# Patient Record
Sex: FEMALE | Race: WHITE | NOT HISPANIC OR LATINO | Employment: FULL TIME | URBAN - METROPOLITAN AREA
[De-identification: names, ages, dates, MRNs, and addresses within clinical notes are randomized per-mention and may not be internally consistent; named-entity substitution may affect disease eponyms.]

---

## 2020-01-06 ENCOUNTER — HOSPITAL ENCOUNTER (OUTPATIENT)
Dept: LAB | Facility: MEDICAL CENTER | Age: 30
End: 2020-01-06
Attending: INTERNAL MEDICINE
Payer: COMMERCIAL

## 2020-01-06 ENCOUNTER — OFFICE VISIT (OUTPATIENT)
Dept: CARDIOLOGY | Facility: MEDICAL CENTER | Age: 30
End: 2020-01-06
Payer: COMMERCIAL

## 2020-01-06 VITALS
WEIGHT: 180 LBS | HEART RATE: 100 BPM | HEIGHT: 64 IN | BODY MASS INDEX: 30.73 KG/M2 | DIASTOLIC BLOOD PRESSURE: 80 MMHG | OXYGEN SATURATION: 98 % | SYSTOLIC BLOOD PRESSURE: 140 MMHG

## 2020-01-06 DIAGNOSIS — E78.5 HYPERLIPIDEMIA, UNSPECIFIED HYPERLIPIDEMIA TYPE: ICD-10-CM

## 2020-01-06 DIAGNOSIS — I34.1 MITRAL VALVE PROLAPSE: ICD-10-CM

## 2020-01-06 DIAGNOSIS — Z82.49 FAMILY HISTORY OF CORONARY ARTERY DISEASE: ICD-10-CM

## 2020-01-06 LAB
CHOLEST SERPL-MCNC: 206 MG/DL (ref 100–199)
EKG IMPRESSION: NORMAL
FASTING STATUS PATIENT QL REPORTED: NORMAL
HDLC SERPL-MCNC: 35 MG/DL
LDLC SERPL CALC-MCNC: 136 MG/DL
TRIGL SERPL-MCNC: 176 MG/DL (ref 0–149)

## 2020-01-06 PROCEDURE — 36415 COLL VENOUS BLD VENIPUNCTURE: CPT

## 2020-01-06 PROCEDURE — 99244 OFF/OP CNSLTJ NEW/EST MOD 40: CPT | Performed by: INTERNAL MEDICINE

## 2020-01-06 PROCEDURE — 80061 LIPID PANEL: CPT

## 2020-01-06 PROCEDURE — 93000 ELECTROCARDIOGRAM COMPLETE: CPT | Performed by: INTERNAL MEDICINE

## 2020-01-06 RX ORDER — FEXOFENADINE HCL 60 MG/1
60 TABLET, FILM COATED ORAL DAILY
COMMUNITY

## 2020-01-06 RX ORDER — DIAZEPAM 2 MG/1
2 TABLET ORAL
COMMUNITY
Start: 2015-09-23 | End: 2020-01-06

## 2020-01-06 RX ORDER — CETIRIZINE HYDROCHLORIDE 10 MG/1
TABLET ORAL
COMMUNITY
End: 2020-01-06

## 2020-01-06 RX ORDER — FLUTICASONE PROPIONATE 50 MCG
2 SPRAY, SUSPENSION (ML) NASAL
COMMUNITY
Start: 2015-02-11 | End: 2020-01-06

## 2020-01-06 ASSESSMENT — ENCOUNTER SYMPTOMS
ORTHOPNEA: 0
DEPRESSION: 0
PALPITATIONS: 0
FALLS: 0
LOSS OF CONSCIOUSNESS: 0
SHORTNESS OF BREATH: 0
PND: 0
DIZZINESS: 0
ABDOMINAL PAIN: 0

## 2020-01-06 NOTE — LETTER
[facility]   [address]   [address]  [place] [address]  Phone: [phone]  Fax: [phone]              Cecilia Agarwal  1990    Encounter Date: 1/6/2020    Amy José M.D.          PROGRESS NOTE:  Chief Complaint   Patient presents with   • Hyperlipidemia       Subjective:   Cecilia Agarwal is a 29 y.o. female referred to cardiology clinic for management of hyperlipidemia.    Patient is a  of [employer] and reports that she had been eating pizza very regularly till October when she had her lipid panel checked.  Since then she has become a vegan for the most part and does eat fish as well.  She has not had any pizza in the past 3 months.  She has lost 20 pounds with her dietary modification.    She exercises 3 times a week on the elliptical covering 3.5 miles in 30 minutes without any anginal symptoms.    She worries about her hyperlipidemia as her father had an MI at the age of 50.  Since then he has had a CABG and has multiple stents.  None of her father's siblings, parents or her siblings have heart disease.    Patient reports that about 5 years ago she was seen by a cardiologist in [place] and was told that she may have mitral valve prolapse.    Referring physician: DILLON Pereira    History reviewed. No pertinent past medical history.  History reviewed. No pertinent surgical history.  Family History   Problem Relation Age of Onset   • Heart Attack Father 50     Social History     Socioeconomic History   • Marital status:      Spouse name: Not on file   • Number of children: Not on file   • Years of education: Not on file   • Highest education level: Not on file   Occupational History   • Not on file   Social Needs   • Financial resource strain: Not on file   • Food insecurity:     Worry: Not on file     Inability: Not on file   • Transportation needs:     Medical: Not on file     Non-medical: Not  on file   Tobacco Use   • Smoking status: Never Smoker   • Smokeless tobacco: Never Used   Substance and Sexual Activity   • Alcohol use: Yes     Comment: 1 glass of wine in a week   • Drug use: Never   • Sexual activity: Not on file     Comment:    Lifestyle   • Physical activity:     Days per week: Not on file     Minutes per session: Not on file   • Stress: Not on file   Relationships   • Social connections:     Talks on phone: Not on file     Gets together: Not on file     Attends Hindu service: Not on file     Active member of club or organization: Not on file     Attends meetings of clubs or organizations: Not on file     Relationship status: Not on file   • Intimate partner violence:     Fear of current or ex partner: Not on file     Emotionally abused: Not on file     Physically abused: Not on file     Forced sexual activity: Not on file   Other Topics Concern   • Not on file   Social History Narrative   • Not on file     Allergies   Allergen Reactions   • Flagyl [Kdc:Metronidazole+Tartrazine] Palpitations     Tingling in arms,burning chest,racing heart     Outpatient Encounter Medications as of 1/6/2020   Medication Sig Dispense Refill   • fexofenadine (ALLEGRA) 60 MG Tab Take 60 mg by mouth every day.     • [DISCONTINUED] fluticasone (FLONASE) 50 MCG/ACT nasal spray Spray 2 Sprays in nose.     • [DISCONTINUED] diazePAM (VALIUM) 2 MG Tab Take 2 mg by mouth.     • [DISCONTINUED] Albuterol Sulfate 108 (90 Base) MCG/ACT AEROSOL POWDER, BREATH ACTIVATED Take 1-4 puff every 4-6 hours as needed     • [DISCONTINUED] cetirizine (ZYRTEC) 10 MG Tab Take  by mouth.     • [DISCONTINUED] promethazine-codeine (PHENERGAN-CODEINE) 6.25-10 MG/5ML SYRP Take 5 mL by mouth 4 times a day as needed for Cough. 120 mL 0     No facility-administered encounter medications on file as of 1/6/2020.      Review of Systems   Constitutional: Negative for malaise/fatigue.   Respiratory: Negative for shortness of breath.     "  Cardiovascular: Negative for chest pain, palpitations, orthopnea, leg swelling and PND.   Gastrointestinal: Negative for abdominal pain.   Musculoskeletal: Negative for falls.   Neurological: Negative for dizziness and loss of consciousness.   Psychiatric/Behavioral: Negative for depression.   All other systems reviewed and are negative.       Objective:   /80 (BP Location: Left arm, Patient Position: Sitting)   Pulse 100   Ht 1.626 m (5' 4\")   Wt 81.6 kg (180 lb)   SpO2 98%   BMI 30.90 kg/m²      Physical Exam   Constitutional: She is oriented to person, place, and time. She appears well-developed and well-nourished. No distress.   HENT:   Head: Normocephalic and atraumatic.   Eyes: Conjunctivae are normal. No scleral icterus.   Neck: Normal range of motion. Neck supple.   Cardiovascular: Normal rate, regular rhythm and normal heart sounds. Exam reveals no gallop and no friction rub.   No murmur heard.  Pulmonary/Chest: Effort normal and breath sounds normal. No respiratory distress. She has no wheezes. She has no rales.   Abdominal: Soft. She exhibits no distension. There is no tenderness.   Musculoskeletal:         General: No edema.   Neurological: She is alert and oriented to person, place, and time.   Skin: Skin is warm and dry. She is not diaphoretic.   Psychiatric: She has a normal mood and affect. Her behavior is normal.   Nursing note and vitals reviewed.    EKG performed today was personally reviewed and per my interpretation shows sinus rhythm.    Labs performed in October 2019 were reviewed and showed , HDL 35, triglycerides 309.  Normal creatinine.  Normal hemoglobin.      Assessment:     1. Hyperlipidemia, unspecified hyperlipidemia type  EKG    Lipid Profile   2. Family history of coronary artery disease     3. Mitral valve prolapse         Medical Decision Making:  Today's Assessment / Status / Plan:     Hyperlipidemia: Lipid panel results were discussed in detail with the " patient.  Her abnormal numbers are likely secondary to her unhealthy diet prior to her lab results.  I have advised her to have a healthy diet, everything in moderation.  Repeat labs ordered today.  No indication for statin therapy at this time.    Family history of coronary artery disease: Patient's father had an MI at the age of 50.  No other family members with coronary artery disease.  For now would not recommend any new medications or work-up.  Patient should continue exercising as she has been.  If she has any anginal symptoms, she should let us know.    Concern for mitral valve prolapse: No murmur appreciated exam today.  Patient is asymptomatic.  Continue to monitor.    Elevated blood pressure: Her blood pressure is always normal at home but elevated in the medical office.  Likely whitecoat hypertension.  No further work-up for now.    Return to clinic in 1 year or earlier if needed.    Thank you for allowing me to participate in the care of this patient. Please do not hesitate to contact me with any questions.    Amy José MD, [facility]  Cardiologist  [facility]    PLEASE NOTE: This dictation was created using voice recognition software.         Violetta Arceo A.P.R.NUsman  [address] [address]  VIA Facsimile: [phone]

## 2020-01-06 NOTE — PROGRESS NOTES
Chief Complaint   Patient presents with   • Hyperlipidemia       Subjective:   Cecilia Agarwal is a 29 y.o. female referred to cardiology clinic for management of hyperlipidemia.    Patient is a  of a [employer] and reports that she had been eating pizza very regularly till October when she had her lipid panel checked.  Since then she has become a vegan for the most part and does eat fish as well.  She has not had any pizza in the past 3 months.  She has lost 20 pounds with her dietary modification.    She exercises 3 times a week on the elliptical covering 3.5 miles in 30 minutes without any anginal symptoms.    She worries about her hyperlipidemia as her father had an MI at the age of 50.  Since then he has had a CABG and has multiple stents.  None of her father's siblings, parents or her siblings have heart disease.    Patient reports that about 5 years ago she was seen by a cardiologist in [place] and was told that she may have mitral valve prolapse.    Referring physician: DILLON Pereira    History reviewed. No pertinent past medical history.  History reviewed. No pertinent surgical history.  Family History   Problem Relation Age of Onset   • Heart Attack Father 50     Social History     Socioeconomic History   • Marital status:      Spouse name: Not on file   • Number of children: Not on file   • Years of education: Not on file   • Highest education level: Not on file   Occupational History   • Not on file   Social Needs   • Financial resource strain: Not on file   • Food insecurity:     Worry: Not on file     Inability: Not on file   • Transportation needs:     Medical: Not on file     Non-medical: Not on file   Tobacco Use   • Smoking status: Never Smoker   • Smokeless tobacco: Never Used   Substance and Sexual Activity   • Alcohol use: Yes     Comment: 1 glass of wine in a week   • Drug use: Never   • Sexual activity: Not on file     Comment:    Lifestyle   • Physical  activity:     Days per week: Not on file     Minutes per session: Not on file   • Stress: Not on file   Relationships   • Social connections:     Talks on phone: Not on file     Gets together: Not on file     Attends Sabianism service: Not on file     Active member of club or organization: Not on file     Attends meetings of clubs or organizations: Not on file     Relationship status: Not on file   • Intimate partner violence:     Fear of current or ex partner: Not on file     Emotionally abused: Not on file     Physically abused: Not on file     Forced sexual activity: Not on file   Other Topics Concern   • Not on file   Social History Narrative   • Not on file     Allergies   Allergen Reactions   • Flagyl [Kdc:Metronidazole+Tartrazine] Palpitations     Tingling in arms,burning chest,racing heart     Outpatient Encounter Medications as of 1/6/2020   Medication Sig Dispense Refill   • fexofenadine (ALLEGRA) 60 MG Tab Take 60 mg by mouth every day.     • [DISCONTINUED] fluticasone (FLONASE) 50 MCG/ACT nasal spray Spray 2 Sprays in nose.     • [DISCONTINUED] diazePAM (VALIUM) 2 MG Tab Take 2 mg by mouth.     • [DISCONTINUED] Albuterol Sulfate 108 (90 Base) MCG/ACT AEROSOL POWDER, BREATH ACTIVATED Take 1-4 puff every 4-6 hours as needed     • [DISCONTINUED] cetirizine (ZYRTEC) 10 MG Tab Take  by mouth.     • [DISCONTINUED] promethazine-codeine (PHENERGAN-CODEINE) 6.25-10 MG/5ML SYRP Take 5 mL by mouth 4 times a day as needed for Cough. 120 mL 0     No facility-administered encounter medications on file as of 1/6/2020.      Review of Systems   Constitutional: Negative for malaise/fatigue.   Respiratory: Negative for shortness of breath.    Cardiovascular: Negative for chest pain, palpitations, orthopnea, leg swelling and PND.   Gastrointestinal: Negative for abdominal pain.   Musculoskeletal: Negative for falls.   Neurological: Negative for dizziness and loss of consciousness.   Psychiatric/Behavioral: Negative for  "depression.   All other systems reviewed and are negative.       Objective:   /80 (BP Location: Left arm, Patient Position: Sitting)   Pulse 100   Ht 1.626 m (5' 4\")   Wt 81.6 kg (180 lb)   SpO2 98%   BMI 30.90 kg/m²     Physical Exam   Constitutional: She is oriented to person, place, and time. She appears well-developed and well-nourished. No distress.   HENT:   Head: Normocephalic and atraumatic.   Eyes: Conjunctivae are normal. No scleral icterus.   Neck: Normal range of motion. Neck supple.   Cardiovascular: Normal rate, regular rhythm and normal heart sounds. Exam reveals no gallop and no friction rub.   No murmur heard.  Pulmonary/Chest: Effort normal and breath sounds normal. No respiratory distress. She has no wheezes. She has no rales.   Abdominal: Soft. She exhibits no distension. There is no tenderness.   Musculoskeletal:         General: No edema.   Neurological: She is alert and oriented to person, place, and time.   Skin: Skin is warm and dry. She is not diaphoretic.   Psychiatric: She has a normal mood and affect. Her behavior is normal.   Nursing note and vitals reviewed.    EKG performed today was personally reviewed and per my interpretation shows sinus rhythm.    Labs performed in October 2019 were reviewed and showed , HDL 35, triglycerides 309.  Normal creatinine.  Normal hemoglobin.      Assessment:     1. Hyperlipidemia, unspecified hyperlipidemia type  EKG    Lipid Profile   2. Family history of coronary artery disease     3. Mitral valve prolapse         Medical Decision Making:  Today's Assessment / Status / Plan:     Hyperlipidemia: Lipid panel results were discussed in detail with the patient.  Her abnormal numbers are likely secondary to her unhealthy diet prior to her lab results.  I have advised her to have a healthy diet, everything in moderation.  Repeat labs ordered today.  No indication for statin therapy at this time.    Family history of coronary artery " disease: Patient's father had an MI at the age of 50.  No other family members with coronary artery disease.  For now would not recommend any new medications or work-up.  Patient should continue exercising as she has been.  If she has any anginal symptoms, she should let us know.    Concern for mitral valve prolapse: No murmur appreciated exam today.  Patient is asymptomatic.  Continue to monitor.    Elevated blood pressure: Her blood pressure is always normal at home but elevated in the medical office.  Likely whitecoat hypertension.  No further work-up for now.    Return to clinic in 1 year or earlier if needed.    Thank you for allowing me to participate in the care of this patient. Please do not hesitate to contact me with any questions.    Amy José MD, [facility]  Cardiologist  [facility]    PLEASE NOTE: This dictation was created using voice recognition software.

## 2020-01-29 ENCOUNTER — TELEPHONE (OUTPATIENT)
Dept: CARDIOLOGY | Facility: MEDICAL CENTER | Age: 30
End: 2020-01-29

## 2020-08-25 ENCOUNTER — PATIENT MESSAGE (OUTPATIENT)
Dept: CARDIOLOGY | Facility: MEDICAL CENTER | Age: 30
End: 2020-08-25

## 2020-08-25 ENCOUNTER — HOSPITAL ENCOUNTER (EMERGENCY)
Facility: MEDICAL CENTER | Age: 30
End: 2020-08-26
Attending: EMERGENCY MEDICINE
Payer: COMMERCIAL

## 2020-08-25 VITALS
SYSTOLIC BLOOD PRESSURE: 145 MMHG | HEART RATE: 92 BPM | RESPIRATION RATE: 16 BRPM | OXYGEN SATURATION: 97 % | BODY MASS INDEX: 30.37 KG/M2 | DIASTOLIC BLOOD PRESSURE: 101 MMHG | TEMPERATURE: 98.2 F | WEIGHT: 177.91 LBS | HEIGHT: 64 IN

## 2020-08-25 DIAGNOSIS — Z82.49 FAMILY HISTORY OF CORONARY ARTERY DISEASE: ICD-10-CM

## 2020-08-25 DIAGNOSIS — E78.5 HYPERLIPIDEMIA, UNSPECIFIED HYPERLIPIDEMIA TYPE: ICD-10-CM

## 2020-08-25 DIAGNOSIS — R10.13 EPIGASTRIC PAIN: ICD-10-CM

## 2020-08-25 DIAGNOSIS — R07.9 CHEST PAIN, UNSPECIFIED TYPE: ICD-10-CM

## 2020-08-25 PROCEDURE — 99285 EMERGENCY DEPT VISIT HI MDM: CPT

## 2020-08-25 PROCEDURE — 93005 ELECTROCARDIOGRAM TRACING: CPT | Performed by: EMERGENCY MEDICINE

## 2020-08-25 PROCEDURE — 93005 ELECTROCARDIOGRAM TRACING: CPT

## 2020-08-25 PROCEDURE — 96375 TX/PRO/DX INJ NEW DRUG ADDON: CPT

## 2020-08-25 PROCEDURE — 96374 THER/PROPH/DIAG INJ IV PUSH: CPT

## 2020-08-25 SDOH — HEALTH STABILITY: MENTAL HEALTH: HOW OFTEN DO YOU HAVE 6 OR MORE DRINKS ON ONE OCCASION?: NEVER

## 2020-08-25 SDOH — HEALTH STABILITY: MENTAL HEALTH: HOW MANY STANDARD DRINKS CONTAINING ALCOHOL DO YOU HAVE ON A TYPICAL DAY?: 1 OR 2

## 2020-08-25 SDOH — HEALTH STABILITY: MENTAL HEALTH: HOW OFTEN DO YOU HAVE A DRINK CONTAINING ALCOHOL?: 2-4 TIMES A MONTH

## 2020-08-26 ENCOUNTER — APPOINTMENT (OUTPATIENT)
Dept: RADIOLOGY | Facility: MEDICAL CENTER | Age: 30
End: 2020-08-26
Attending: EMERGENCY MEDICINE
Payer: COMMERCIAL

## 2020-08-26 LAB
ALBUMIN SERPL BCP-MCNC: 4.8 G/DL (ref 3.2–4.9)
ALBUMIN/GLOB SERPL: 1.9 G/DL
ALP SERPL-CCNC: 44 U/L (ref 30–99)
ALT SERPL-CCNC: 24 U/L (ref 2–50)
ANION GAP SERPL CALC-SCNC: 15 MMOL/L (ref 7–16)
AST SERPL-CCNC: 16 U/L (ref 12–45)
BASOPHILS # BLD AUTO: 0.8 % (ref 0–1.8)
BASOPHILS # BLD: 0.05 K/UL (ref 0–0.12)
BILIRUB SERPL-MCNC: 0.5 MG/DL (ref 0.1–1.5)
BUN SERPL-MCNC: 9 MG/DL (ref 8–22)
CALCIUM SERPL-MCNC: 9.5 MG/DL (ref 8.5–10.5)
CHLORIDE SERPL-SCNC: 98 MMOL/L (ref 96–112)
CO2 SERPL-SCNC: 22 MMOL/L (ref 20–33)
CREAT SERPL-MCNC: 0.75 MG/DL (ref 0.5–1.4)
EKG IMPRESSION: NORMAL
EOSINOPHIL # BLD AUTO: 0.1 K/UL (ref 0–0.51)
EOSINOPHIL NFR BLD: 1.7 % (ref 0–6.9)
ERYTHROCYTE [DISTWIDTH] IN BLOOD BY AUTOMATED COUNT: 38 FL (ref 35.9–50)
GLOBULIN SER CALC-MCNC: 2.5 G/DL (ref 1.9–3.5)
GLUCOSE SERPL-MCNC: 102 MG/DL (ref 65–99)
HCG SERPL QL: NEGATIVE
HCT VFR BLD AUTO: 42.2 % (ref 37–47)
HGB BLD-MCNC: 14.1 G/DL (ref 12–16)
IMM GRANULOCYTES # BLD AUTO: 0.01 K/UL (ref 0–0.11)
IMM GRANULOCYTES NFR BLD AUTO: 0.2 % (ref 0–0.9)
LIPASE SERPL-CCNC: 24 U/L (ref 11–82)
LYMPHOCYTES # BLD AUTO: 2.47 K/UL (ref 1–4.8)
LYMPHOCYTES NFR BLD: 41.9 % (ref 22–41)
MCH RBC QN AUTO: 29.6 PG (ref 27–33)
MCHC RBC AUTO-ENTMCNC: 33.4 G/DL (ref 33.6–35)
MCV RBC AUTO: 88.5 FL (ref 81.4–97.8)
MONOCYTES # BLD AUTO: 0.33 K/UL (ref 0–0.85)
MONOCYTES NFR BLD AUTO: 5.6 % (ref 0–13.4)
NEUTROPHILS # BLD AUTO: 2.94 K/UL (ref 2–7.15)
NEUTROPHILS NFR BLD: 49.8 % (ref 44–72)
NRBC # BLD AUTO: 0 K/UL
NRBC BLD-RTO: 0 /100 WBC
PLATELET # BLD AUTO: 242 K/UL (ref 164–446)
PMV BLD AUTO: 9.4 FL (ref 9–12.9)
POTASSIUM SERPL-SCNC: 3.5 MMOL/L (ref 3.6–5.5)
PROT SERPL-MCNC: 7.3 G/DL (ref 6–8.2)
RBC # BLD AUTO: 4.77 M/UL (ref 4.2–5.4)
SODIUM SERPL-SCNC: 135 MMOL/L (ref 135–145)
TROPONIN T SERPL-MCNC: <6 NG/L (ref 6–19)
WBC # BLD AUTO: 5.9 K/UL (ref 4.8–10.8)

## 2020-08-26 PROCEDURE — A9270 NON-COVERED ITEM OR SERVICE: HCPCS | Performed by: EMERGENCY MEDICINE

## 2020-08-26 PROCEDURE — 85025 COMPLETE CBC W/AUTO DIFF WBC: CPT

## 2020-08-26 PROCEDURE — 700111 HCHG RX REV CODE 636 W/ 250 OVERRIDE (IP): Performed by: EMERGENCY MEDICINE

## 2020-08-26 PROCEDURE — 80053 COMPREHEN METABOLIC PANEL: CPT

## 2020-08-26 PROCEDURE — 83690 ASSAY OF LIPASE: CPT

## 2020-08-26 PROCEDURE — 71045 X-RAY EXAM CHEST 1 VIEW: CPT

## 2020-08-26 PROCEDURE — 84703 CHORIONIC GONADOTROPIN ASSAY: CPT

## 2020-08-26 PROCEDURE — 700102 HCHG RX REV CODE 250 W/ 637 OVERRIDE(OP): Performed by: EMERGENCY MEDICINE

## 2020-08-26 PROCEDURE — 84484 ASSAY OF TROPONIN QUANT: CPT

## 2020-08-26 PROCEDURE — 700105 HCHG RX REV CODE 258: Performed by: EMERGENCY MEDICINE

## 2020-08-26 RX ORDER — OMEPRAZOLE 20 MG/1
20 CAPSULE, DELAYED RELEASE ORAL DAILY
Qty: 30 CAP | Refills: 0 | Status: SHIPPED | OUTPATIENT
Start: 2020-08-26 | End: 2020-09-03 | Stop reason: SDUPTHER

## 2020-08-26 RX ORDER — SODIUM CHLORIDE 9 MG/ML
1000 INJECTION, SOLUTION INTRAVENOUS ONCE
Status: COMPLETED | OUTPATIENT
Start: 2020-08-26 | End: 2020-08-26

## 2020-08-26 RX ORDER — ONDANSETRON 2 MG/ML
4 INJECTION INTRAMUSCULAR; INTRAVENOUS ONCE
Status: COMPLETED | OUTPATIENT
Start: 2020-08-26 | End: 2020-08-26

## 2020-08-26 RX ADMIN — SODIUM CHLORIDE 1000 ML: 9 INJECTION, SOLUTION INTRAVENOUS at 00:11

## 2020-08-26 RX ADMIN — FAMOTIDINE 20 MG: 10 INJECTION INTRAVENOUS at 00:11

## 2020-08-26 RX ADMIN — LIDOCAINE HYDROCHLORIDE 30 ML: 20 SOLUTION OROPHARYNGEAL at 00:11

## 2020-08-26 RX ADMIN — ONDANSETRON 4 MG: 2 INJECTION INTRAMUSCULAR; INTRAVENOUS at 00:11

## 2020-08-26 NOTE — ED TRIAGE NOTES
"Cecilia Agarwal   30 y.o. female     Chief Complaint   Patient presents with   • Chest Pain     \"steady pain in my chest starts in my diaphragm and works it way up\" \"it feels like heart burn that never goes away\"    • Abdominal Pain     \"its a sharp nodded feeling\"       Pt amb to triage with steady gait for above complaint.     Chest Pain - Started on Sunday. Starts in the center of the chest and radiated to the right. Describes as \"achy burning.\"     ABD - Sharp, nodded pain that happened on Wednesday. Felt better after BM     Pt is alert and oriented, speaking in full sentences, follows commands and responds appropriately to questions. NAD. Resp are even and unlabored.     Pt placed in lobby. Pt educated on triage process. Pt encouraged to alert staff for any changes.    /104   Pulse 92   Temp 36.8 °C (98.2 °F) (Temporal)   Resp 16   Ht 1.626 m (5' 4\")   Wt 80.7 kg (177 lb 14.6 oz)   LMP 08/11/2020   SpO2 97%   BMI 30.54 kg/m²       "

## 2020-08-26 NOTE — DISCHARGE INSTRUCTIONS
You were seen in the Emergency Department for abdominal pain and chest pain.    EKG, labs, chest xray were completed without significant acute abnormalities.    Please use 1,000mg of tylenol  every 6 hours as needed for pain. Take acid blocking medication as directed.  Avoid spicy foods, alcohol, NSAIDs such as ibuprofen.    Please follow up with your primary care physician.    Return to the Emergency Department with worsening symptoms.

## 2020-08-26 NOTE — ED PROVIDER NOTES
"ED Provider Note    Scribed for Rebeca Alvarez M.D. by Rosalba Garcia. 8/25/2020  11:54 PM    Means of arrival: walk in  History obtained from: patient  History limited by: none    CHIEF COMPLAINT  Chief Complaint   Patient presents with   • Chest Pain     \"steady pain in my chest starts in my diaphragm and works it way up\" \"it feels like heart burn that never goes away\"    • Abdominal Pain     \"its a sharp nodded feeling\"       HPI  Cecilia Agarwal is a 30 y.o. female who presents to the Emergency Department for moderate chest pain and abdominal pain. Per the patient, her abdominal pain is constant and began 1 week ago; she describes this pain as a \"knotting\" pain. She states her chest pain is a constant aching pain that radiates from the middle of her chest up to her neck; this symptom began two days ago. She reports additional symptoms of nausea, cough, and dyspnea, and denies fever or lower extremity edema. She states that she is currently trying to get pregnant. The patient also denies abdominal surgery. No alleviating or exacerbating factors were noted.     REVIEW OF SYSTEMS  Pertinent positive include chest pain, abdominal pain, nausea, cough, and dyspnea. Pertinent negative include fever or lower extremity edema. All other systems reviewed and are negative.    PAST MEDICAL HISTORY   None pertinent    SOCIAL HISTORY  Social History     Tobacco Use   • Smoking status: Never Smoker   • Smokeless tobacco: Never Used   Substance and Sexual Activity   • Alcohol use: Yes     Frequency: 2-4 times a month     Drinks per session: 1 or 2     Binge frequency: Never     Comment: 1 glass of wine in a week   • Drug use: Never   • Sexual activity: Not noted     Comment:        SURGICAL HISTORY  patient denies any surgical history    CURRENT MEDICATIONS  Home Medications     Reviewed by Bao Chatman R.N. (Registered Nurse) on 08/25/20 at 2310  Med List Status: <None>   Medication Last Dose " "Status   fexofenadine (ALLEGRA) 60 MG Tab  Active                ALLERGIES  Allergies   Allergen Reactions   • Flagyl [Kdc:Metronidazole+Tartrazine] Palpitations     Tingling in arms,burning chest,racing heart       PHYSICAL EXAM   VITAL SIGNS: /101   Pulse 92   Temp 36.8 °C (98.2 °F) (Temporal)   Resp 16   Ht 1.626 m (5' 4\")   Wt 80.7 kg (177 lb 14.6 oz)   LMP 2020   SpO2 97%   BMI 30.54 kg/m²    Constitutional: Nontoxic appearing young female, Alert in no apparent distress.  HENT: Normocephalic, Atraumatic. Bilateral external ears normal. Nose normal.  Moist mucous membranes.  Oropharynx clear.  Eyes: Pupils are equal and reactive. Conjunctiva normal.   Neck: Supple, full range of motion  Heart: Regular rate and rhythm.  No murmurs.    Lungs: No respiratory distress, normal work of breathing. Lungs clear to auscultation bilaterally.  Abdomen Soft, no distention.  No tenderness to palpation.  Musculoskeletal: Atraumatic. No obvious deformities noted.  No lower extremity edema.  Skin: Warm, Dry.  No erythema, No rash.   Neurologic: Alert and oriented x3. Moving all extremities spontaneously without focal deficits.  Psychiatric: Affect normal, Mood normal, Appears appropriate and not intoxicated.    PPE Note: I personally donned full PPE for all patient encounters during this visit, including wearing an N95 respirator mask, gloves, and eye protection. Scribe remained outside the patient's room and did not have any contact with the patient for the duration of patient encounter.        DIAGNOSTIC STUDIES    EKG  Results for orders placed or performed during the hospital encounter of 20   EKG   Result Value Ref Range    Report       [facility] Emergency Dept.    Test Date:  2020  Pt Name:    DANYELL GÓMEZ          Department: ER  MRN:        [id number]                      Room:  Gender:     Female                       Technician: [id number]  :        1990      " "             Requested By:ER TRIAGE PROTOCOL  Order #:    [id number]                    Reading MD: Rebeca Alvarez MD    Measurements  Intervals                                Axis  Rate:       88                           P:          32  MO:         176                          QRS:        26  QRSD:       94                           T:          -7  QT:         356  QTc:        431    Interpretive Statements  SINUS RHYTHM  Normal axis and intervals  No ectopy or hypertrophy  No ST or T wave change  Compared to ECG 01/06/2020 09:51:45  No significant change from prior  Electronically Signed On 8- 0:17:38 PDT by Rebeca Alvarez MD         LABS  Personally reviewed by me  Labs Reviewed   CBC WITH DIFFERENTIAL - Abnormal; Notable for the following components:       Result Value    MCHC 33.4 (*)     Lymphocytes 41.90 (*)     All other components within normal limits   COMP METABOLIC PANEL - Abnormal; Notable for the following components:    Potassium 3.5 (*)     Glucose 102 (*)     All other components within normal limits   LIPASE   TROPONIN   HCG QUAL SERUM   ESTIMATED GFR       RADIOLOGY  Personally reviewed by me  DX-CHEST-PORTABLE (1 VIEW)   Final Result         1.  No acute cardiopulmonary disease.          ED COURSE  Vitals:    08/25/20 2243 08/25/20 2303 08/25/20 2316 08/25/20 2317   BP: 158/104  144/103 145/101   Pulse: 92      Resp: 16      Temp: 36.8 °C (98.2 °F)      TempSrc: Temporal      SpO2: 97%      Weight:  80.7 kg (177 lb 14.6 oz)     Height:  1.626 m (5' 4\")           Medications administered:  Medications   NS infusion 1,000 mL (0 mL Intravenous Stopped 8/26/20 0146)   famotidine (PEPCID) injection 20 mg (20 mg Intravenous Given 8/26/20 0011)   ondansetron (ZOFRAN) syringe/vial injection 4 mg (4 mg Intravenous Given 8/26/20 0011)   hyoscyamine-maalox plus-lidocaine viscous (GI COCKTAIL) oral susp 30 mL (30 mL Oral Given 8/26/20 0011)     Patient was given IV fluids for abdominal pain and " vomiting.  IV hydration was used because oral hydration was not adequate alone.  Following fluid administration patient's symptoms were improved.      11:54 PM Patient seen and examined at bedside. The patient presents with chest pain and abdominal pain. Ordered for DX-chest, Estimated GFR, HCG qual serum, CBC w/diff, CMP, Lipase, Troponin, and EKG to evaluate. Patient will be treated with NS infusion 1000mL, Pepcid 20mg, Zofran 4mg, and a GI Cocktail 30mL for her symptoms.     MEDICAL DECISION MAKING  Young otherwise healthy patient presents with few day history of upper abdominal and chest pain.  She is nontoxic-appearing with reassuring vital signs on arrival.  Her abdominal exam is reassuring without concern for underlying surgical process such as obstruction, perforation, appendicitis.  Her EKG does not show signs of ischemia or arrhythmia.  Troponin is negative.  Clinically doubt ACS or pulmonary embolism.  Chest x-ray does not show signs of pneumonia, pneumothorax, pulmonary edema.  Labs are reassuring without leukocytosis, transaminitis, or elevated lipase concerning for pancreatitis.  I suspect patient symptoms may be due to underlying gastritis or ulcer.  She will be treated symptomatically followed by reassessment    1:39 AM - Upon reassessment, patient is resting comfortably with normal vital signs.  No new complaints at this time.  She does report improvement of her symptoms at this time.  Discussed results with patient and/or family as well as importance of primary care follow up.  Will discharge home on PPI and given diet recommendations.  Patient understands plan of care and strict return precautions for new or changing symptoms.         The patient will return for new or worsening symptoms and is stable at the time of discharge.    The patient is referred to a primary physician for blood pressure management, diabetic screening, and for all other preventative health concerns.    DISPOSITION:  Patient  will be discharged home in stable condition.    FOLLOW UP:  Violetta Arceo A.P.R.NUsman  [address] [id number] [phone]    Schedule an appointment as soon as possible for a visit       [facility]  [address] [facility] [address] [phone]  Schedule an appointment as soon as possible for a visit       [facility], Emergency Dept  [address] [facility] [address] [phone]    If symptoms worsen      OUTPATIENT MEDICATIONS:  Discharge Medication List as of 8/26/2020  1:46 AM      START taking these medications    Details   omeprazole (PRILOSEC) 20 MG delayed-release capsule Take 1 Cap by mouth every day., Disp-30 Cap,R-0, Print Rx Paper             IMPRESSION  (R10.13) Epigastric pain  (R07.9) Chest pain, unspecified type    Results, diagnoses, and treatment options were discussed with the patient and/or family. Patient verbalized understanding of plan of care.       Rosalba MALAGON (Yvetteibe), am scribing for, and in the presence of, Rebeca Alvarez M.D..    Electronically signed by: Rosalba Garcia (Yvetteibumm), 8/25/2020    Rebeca MALAGON M.D. personally performed the services described in this documentation, as scribed by Rosalba Garcia in my presence, and it is both accurate and complete.    C    The note accurately reflects work and decisions made by me.  Rebeca Alvarez M.D.  8/26/2020  3:56 AM

## 2020-09-01 ENCOUNTER — HOSPITAL ENCOUNTER (OUTPATIENT)
Dept: LAB | Facility: MEDICAL CENTER | Age: 30
End: 2020-09-01
Attending: INTERNAL MEDICINE
Payer: COMMERCIAL

## 2020-09-01 DIAGNOSIS — Z82.49 FAMILY HISTORY OF CORONARY ARTERY DISEASE: ICD-10-CM

## 2020-09-01 DIAGNOSIS — E78.5 HYPERLIPIDEMIA, UNSPECIFIED HYPERLIPIDEMIA TYPE: ICD-10-CM

## 2020-09-01 PROCEDURE — 80061 LIPID PANEL: CPT

## 2020-09-01 PROCEDURE — 36415 COLL VENOUS BLD VENIPUNCTURE: CPT

## 2020-09-02 LAB
CHOLEST SERPL-MCNC: 217 MG/DL (ref 100–199)
HDLC SERPL-MCNC: 39 MG/DL
LDLC SERPL CALC-MCNC: 150 MG/DL
TRIGL SERPL-MCNC: 138 MG/DL (ref 0–149)

## 2020-09-03 ENCOUNTER — OFFICE VISIT (OUTPATIENT)
Dept: CARDIOLOGY | Facility: MEDICAL CENTER | Age: 30
End: 2020-09-03
Payer: COMMERCIAL

## 2020-09-03 VITALS
WEIGHT: 180 LBS | DIASTOLIC BLOOD PRESSURE: 80 MMHG | SYSTOLIC BLOOD PRESSURE: 130 MMHG | BODY MASS INDEX: 30.73 KG/M2 | HEIGHT: 64 IN | HEART RATE: 80 BPM | OXYGEN SATURATION: 95 %

## 2020-09-03 DIAGNOSIS — R07.9 CHEST PAIN, UNSPECIFIED TYPE: Primary | ICD-10-CM

## 2020-09-03 DIAGNOSIS — Z82.49 FAMILY HISTORY OF CORONARY ARTERY DISEASE: ICD-10-CM

## 2020-09-03 DIAGNOSIS — E78.2 MIXED HYPERLIPIDEMIA: ICD-10-CM

## 2020-09-03 PROBLEM — E78.5 HYPERLIPIDEMIA: Status: RESOLVED | Noted: 2020-01-06 | Resolved: 2020-09-03

## 2020-09-03 PROCEDURE — 99214 OFFICE O/P EST MOD 30 MIN: CPT | Performed by: INTERNAL MEDICINE

## 2020-09-03 RX ORDER — OMEPRAZOLE 20 MG/1
20 CAPSULE, DELAYED RELEASE ORAL DAILY
Qty: 60 CAP | Refills: 0 | Status: SHIPPED | OUTPATIENT
Start: 2020-09-03

## 2020-09-03 ASSESSMENT — ENCOUNTER SYMPTOMS
LOSS OF CONSCIOUSNESS: 0
FALLS: 0
DIZZINESS: 0
ABDOMINAL PAIN: 0
PND: 0
PALPITATIONS: 0
SHORTNESS OF BREATH: 0
DEPRESSION: 0
ORTHOPNEA: 0

## 2020-09-03 ASSESSMENT — FIBROSIS 4 INDEX: FIB4 SCORE: 0.38

## 2020-09-03 NOTE — PROGRESS NOTES
Chief Complaint   Patient presents with   • Hyperlipidemia     Subjective:   Cecilia Agarwal is a 30-year-old female presenting to clinic for follow-up on hyperlipidemia.    Since last visit, patient has been watching her diet very closely.  She has been exercising for an hour and 15 minutes regularly until April following which she stopped because of the pandemic.  She denies any anginal symptoms at that time.    In the past couple weeks she started noticing midepigastric nonradiating burning pain that would occur without any clear triggers.  She had symptoms from was 5 days consistently and then presented to the ER where she was thought to have a gastric ulcer or gastritis and was started on omeprazole.  Since being on omeprazole she reports that her discomfort has been steadily improving.  She denies any prior history of similar symptoms.    History reviewed. No pertinent past medical history.  History reviewed. No pertinent surgical history.  Family History   Problem Relation Age of Onset   • Heart Attack Father 50   • Heart Disease Father    • Hypertension Father    • Familial Hypercholesterolemia Father    • Heart Disease Paternal Aunt    • Heart Disease Paternal Uncle    • Heart Disease Paternal Grandmother    • Heart Disease Paternal Grandfather      Social History     Socioeconomic History   • Marital status:      Spouse name: Not on file   • Number of children: Not on file   • Years of education: Not on file   • Highest education level: Not on file   Occupational History   • Not on file   Social Needs   • Financial resource strain: Not on file   • Food insecurity     Worry: Not on file     Inability: Not on file   • Transportation needs     Medical: Not on file     Non-medical: Not on file   Tobacco Use   • Smoking status: Never Smoker   • Smokeless tobacco: Never Used   Substance and Sexual Activity   • Alcohol use: Yes     Frequency: 2-4 times a month     Drinks per session: 1 or 2     Binge  frequency: Never     Comment: 1 glass of wine in a week   • Drug use: Never   • Sexual activity: Not on file     Comment:    Lifestyle   • Physical activity     Days per week: Not on file     Minutes per session: Not on file   • Stress: Not on file   Relationships   • Social connections     Talks on phone: Not on file     Gets together: Not on file     Attends Holiness service: Not on file     Active member of club or organization: Not on file     Attends meetings of clubs or organizations: Not on file     Relationship status: Not on file   • Intimate partner violence     Fear of current or ex partner: Not on file     Emotionally abused: Not on file     Physically abused: Not on file     Forced sexual activity: Not on file   Other Topics Concern   • Not on file   Social History Narrative   • Not on file     Allergies   Allergen Reactions   • Flagyl [Kdc:Metronidazole+Tartrazine] Palpitations     Tingling in arms,burning chest,racing heart     Outpatient Encounter Medications as of 9/3/2020   Medication Sig Dispense Refill   • omeprazole (PRILOSEC) 20 MG delayed-release capsule Take 1 Cap by mouth every day. 60 Cap 0   • fexofenadine (ALLEGRA) 60 MG Tab Take 60 mg by mouth every day.     • [DISCONTINUED] omeprazole (PRILOSEC) 20 MG delayed-release capsule Take 1 Cap by mouth every day. 30 Cap 0     No facility-administered encounter medications on file as of 9/3/2020.      Review of Systems   Constitutional: Negative for malaise/fatigue.   Respiratory: Negative for shortness of breath.    Cardiovascular: Positive for chest pain. Negative for palpitations, orthopnea, leg swelling and PND.   Gastrointestinal: Negative for abdominal pain.   Musculoskeletal: Negative for falls.   Neurological: Negative for dizziness and loss of consciousness.   Psychiatric/Behavioral: Negative for depression.   All other systems reviewed and are negative.       Objective:   /80 (BP Location: Left arm, Patient Position:  "Sitting)   Pulse 80   Ht 1.626 m (5' 4\")   Wt 81.6 kg (180 lb)   LMP 08/11/2020   SpO2 95%   BMI 30.90 kg/m²     Physical Exam   Constitutional: She is oriented to person, place, and time. She appears well-developed and well-nourished. No distress.   HENT:   Head: Normocephalic and atraumatic.   Eyes: Conjunctivae are normal. No scleral icterus.   Neck: Normal range of motion. Neck supple.   Cardiovascular: Normal rate, regular rhythm and normal heart sounds. Exam reveals no gallop and no friction rub.   No murmur heard.  Pulmonary/Chest: Effort normal and breath sounds normal. No respiratory distress. She has no wheezes. She has no rales.   Musculoskeletal:         General: No edema.   Neurological: She is alert and oriented to person, place, and time.   Skin: Skin is warm and dry. She is not diaphoretic.   Psychiatric: She has a normal mood and affect. Her behavior is normal. Judgment and thought content normal.   Nursing note and vitals reviewed.    Labs performed August 2020 were reviewed and showed normal hemoglobin and creatinine.   in September 2020  , HDL 35, triglycerides 309 in October 2019    EKG performed August 2020 was personally reviewed and per my interpretation shows sinus rhythm.  Nonspecific ST changes.    Assessment:     1. Chest pain, unspecified type     2. Mixed hyperlipidemia     3. Family history of coronary artery disease         Medical Decision Making:  Today's Assessment / Status / Plan:     Patient's new chest discomfort is likely secondary to GERD.  She has been prescribed some extra omeprazole.  She will take it for 3 months.  I will see her back at that time.  If in the interim her chest discomfort worsens, she will let us know.  No indication for stress testing at this time.    She has a history of hyperlipidemia and with dietary modification her triglycerides have significantly improved however LDL is slightly higher than her last visit.  Patient is been " encouraged to start aerobic exercise and increase as tolerated in addition to watching her diet.  No medication changes for now given her young age.      Return to clinic in 3 months or earlier if needed.    Thank you for allowing me to participate in the care of this patient. Please do not hesitate to contact me with any questions.    Amy José MD, [facility]  Cardiologist  [facility]    PLEASE NOTE: This dictation was created using voice recognition software.

## 2020-09-03 NOTE — LETTER
[facility]   [address]   [address]  [place] [address]  Phone: [phone]  Fax: [phone]              Cecilia Agarwal  1990    Encounter Date: 9/3/2020    Amy José M.D.          PROGRESS NOTE:  Chief Complaint   Patient presents with   • Hyperlipidemia     Subjective:   Cecilia Agarwal is a 30-year-old female presenting to clinic for follow-up on hyperlipidemia.    Since last visit, patient has been watching her diet very closely.  She has been exercising for an hour and 15 minutes regularly until April following which she stopped because of the pandemic.  She denies any anginal symptoms at that time.    In the past couple weeks she started noticing midepigastric nonradiating burning pain that would occur without any clear triggers.  She had symptoms from was 5 days consistently and then presented to the ER where she was thought to have a gastric ulcer or gastritis and was started on omeprazole.  Since being on omeprazole she reports that her discomfort has been steadily improving.  She denies any prior history of similar symptoms.    History reviewed. No pertinent past medical history.  History reviewed. No pertinent surgical history.  Family History   Problem Relation Age of Onset   • Heart Attack Father 50   • Heart Disease Father    • Hypertension Father    • Familial Hypercholesterolemia Father    • Heart Disease Paternal Aunt    • Heart Disease Paternal Uncle    • Heart Disease Paternal Grandmother    • Heart Disease Paternal Grandfather      Social History     Socioeconomic History   • Marital status:      Spouse name: Not on file   • Number of children: Not on file   • Years of education: Not on file   • Highest education level: Not on file   Occupational History   • Not on file   Social Needs   • Financial resource strain: Not on file   • Food insecurity     Worry: Not on file     Inability: Not on file   •  Transportation needs     Medical: Not on file     Non-medical: Not on file   Tobacco Use   • Smoking status: Never Smoker   • Smokeless tobacco: Never Used   Substance and Sexual Activity   • Alcohol use: Yes     Frequency: 2-4 times a month     Drinks per session: 1 or 2     Binge frequency: Never     Comment: 1 glass of wine in a week   • Drug use: Never   • Sexual activity: Not on file     Comment:    Lifestyle   • Physical activity     Days per week: Not on file     Minutes per session: Not on file   • Stress: Not on file   Relationships   • Social connections     Talks on phone: Not on file     Gets together: Not on file     Attends Islam service: Not on file     Active member of club or organization: Not on file     Attends meetings of clubs or organizations: Not on file     Relationship status: Not on file   • Intimate partner violence     Fear of current or ex partner: Not on file     Emotionally abused: Not on file     Physically abused: Not on file     Forced sexual activity: Not on file   Other Topics Concern   • Not on file   Social History Narrative   • Not on file     Allergies   Allergen Reactions   • Flagyl [Kdc:Metronidazole+Tartrazine] Palpitations     Tingling in arms,burning chest,racing heart     Outpatient Encounter Medications as of 9/3/2020   Medication Sig Dispense Refill   • omeprazole (PRILOSEC) 20 MG delayed-release capsule Take 1 Cap by mouth every day. 60 Cap 0   • fexofenadine (ALLEGRA) 60 MG Tab Take 60 mg by mouth every day.     • [DISCONTINUED] omeprazole (PRILOSEC) 20 MG delayed-release capsule Take 1 Cap by mouth every day. 30 Cap 0     No facility-administered encounter medications on file as of 9/3/2020.      Review of Systems   Constitutional: Negative for malaise/fatigue.   Respiratory: Negative for shortness of breath.    Cardiovascular: Positive for chest pain. Negative for palpitations, orthopnea, leg swelling and PND.   Gastrointestinal: Negative for abdominal  "pain.   Musculoskeletal: Negative for falls.   Neurological: Negative for dizziness and loss of consciousness.   Psychiatric/Behavioral: Negative for depression.   All other systems reviewed and are negative.       Objective:   /80 (BP Location: Left arm, Patient Position: Sitting)   Pulse 80   Ht 1.626 m (5' 4\")   Wt 81.6 kg (180 lb)   LMP 08/11/2020   SpO2 95%   BMI 30.90 kg/m²     Physical Exam   Constitutional: She is oriented to person, place, and time. She appears well-developed and well-nourished. No distress.   HENT:   Head: Normocephalic and atraumatic.   Eyes: Conjunctivae are normal. No scleral icterus.   Neck: Normal range of motion. Neck supple.   Cardiovascular: Normal rate, regular rhythm and normal heart sounds. Exam reveals no gallop and no friction rub.   No murmur heard.  Pulmonary/Chest: Effort normal and breath sounds normal. No respiratory distress. She has no wheezes. She has no rales.   Musculoskeletal:         General: No edema.   Neurological: She is alert and oriented to person, place, and time.   Skin: Skin is warm and dry. She is not diaphoretic.   Psychiatric: She has a normal mood and affect. Her behavior is normal. Judgment and thought content normal.   Nursing note and vitals reviewed.    Labs performed August 2020 were reviewed and showed normal hemoglobin and creatinine.   in September 2020  , HDL 35, triglycerides 309 in October 2019    EKG performed August 2020 was personally reviewed and per my interpretation shows sinus rhythm.  Nonspecific ST changes.    Assessment:     1. Chest pain, unspecified type     2. Mixed hyperlipidemia     3. Family history of coronary artery disease         Medical Decision Making:  Today's Assessment / Status / Plan:     Patient's new chest discomfort is likely secondary to GERD.  She has been prescribed some extra omeprazole.  She will take it for 3 months.  I will see her back at that time.  If in the interim her chest " discomfort worsens, she will let us know.  No indication for stress testing at this time.    She has a history of hyperlipidemia and with dietary modification her triglycerides have significantly improved however LDL is slightly higher than her last visit.  Patient is been encouraged to start aerobic exercise and increase as tolerated in addition to watching her diet.  No medication changes for now given her young age.      Return to clinic in 3 months or earlier if needed.    Thank you for allowing me to participate in the care of this patient. Please do not hesitate to contact me with any questions.    Amy José MD, [facility]  Cardiologist  [facility]    PLEASE NOTE: This dictation was created using voice recognition software.         Violetta Arceo A.P.R.NUsman  [address] [address]  Via Fax: [phone]

## 2020-09-10 ENCOUNTER — HOSPITAL ENCOUNTER (OUTPATIENT)
Dept: LAB | Facility: MEDICAL CENTER | Age: 30
End: 2020-09-10
Attending: NURSE PRACTITIONER
Payer: COMMERCIAL

## 2020-09-10 LAB
25(OH)D3 SERPL-MCNC: 42 NG/ML (ref 30–100)
ALBUMIN SERPL BCP-MCNC: 4.5 G/DL (ref 3.2–4.9)
ALBUMIN/GLOB SERPL: 1.9 G/DL
ALP SERPL-CCNC: 42 U/L (ref 30–99)
ALT SERPL-CCNC: 19 U/L (ref 2–50)
AMORPH CRY #/AREA URNS HPF: PRESENT /HPF
ANION GAP SERPL CALC-SCNC: 11 MMOL/L (ref 7–16)
APPEARANCE UR: ABNORMAL
AST SERPL-CCNC: 18 U/L (ref 12–45)
BACTERIA #/AREA URNS HPF: ABNORMAL /HPF
BASOPHILS # BLD AUTO: 0.9 % (ref 0–1.8)
BASOPHILS # BLD: 0.04 K/UL (ref 0–0.12)
BILIRUB SERPL-MCNC: 0.5 MG/DL (ref 0.1–1.5)
BILIRUB UR QL STRIP.AUTO: NEGATIVE
BUN SERPL-MCNC: 14 MG/DL (ref 8–22)
CALCIUM SERPL-MCNC: 9 MG/DL (ref 8.5–10.5)
CHLORIDE SERPL-SCNC: 104 MMOL/L (ref 96–112)
CHOLEST SERPL-MCNC: 187 MG/DL (ref 100–199)
CO2 SERPL-SCNC: 24 MMOL/L (ref 20–33)
COLOR UR: YELLOW
CREAT SERPL-MCNC: 0.79 MG/DL (ref 0.5–1.4)
EOSINOPHIL # BLD AUTO: 0.16 K/UL (ref 0–0.51)
EOSINOPHIL NFR BLD: 3.7 % (ref 0–6.9)
EPI CELLS #/AREA URNS HPF: ABNORMAL /HPF
ERYTHROCYTE [DISTWIDTH] IN BLOOD BY AUTOMATED COUNT: 38.7 FL (ref 35.9–50)
GLOBULIN SER CALC-MCNC: 2.4 G/DL (ref 1.9–3.5)
GLUCOSE SERPL-MCNC: 94 MG/DL (ref 65–99)
GLUCOSE UR STRIP.AUTO-MCNC: NEGATIVE MG/DL
HCT VFR BLD AUTO: 41.9 % (ref 37–47)
HDLC SERPL-MCNC: 35 MG/DL
HGB BLD-MCNC: 13.9 G/DL (ref 12–16)
HYALINE CASTS #/AREA URNS LPF: ABNORMAL /LPF
IMM GRANULOCYTES # BLD AUTO: 0 K/UL (ref 0–0.11)
IMM GRANULOCYTES NFR BLD AUTO: 0 % (ref 0–0.9)
KETONES UR STRIP.AUTO-MCNC: NEGATIVE MG/DL
LDLC SERPL CALC-MCNC: 125 MG/DL
LEUKOCYTE ESTERASE UR QL STRIP.AUTO: NEGATIVE
LYMPHOCYTES # BLD AUTO: 2.23 K/UL (ref 1–4.8)
LYMPHOCYTES NFR BLD: 51.1 % (ref 22–41)
MCH RBC QN AUTO: 29.4 PG (ref 27–33)
MCHC RBC AUTO-ENTMCNC: 33.2 G/DL (ref 33.6–35)
MCV RBC AUTO: 88.8 FL (ref 81.4–97.8)
MICRO URNS: ABNORMAL
MONOCYTES # BLD AUTO: 0.22 K/UL (ref 0–0.85)
MONOCYTES NFR BLD AUTO: 5 % (ref 0–13.4)
NEUTROPHILS # BLD AUTO: 1.71 K/UL (ref 2–7.15)
NEUTROPHILS NFR BLD: 39.3 % (ref 44–72)
NITRITE UR QL STRIP.AUTO: NEGATIVE
NRBC # BLD AUTO: 0 K/UL
NRBC BLD-RTO: 0 /100 WBC
PH UR STRIP.AUTO: 5.5 [PH] (ref 5–8)
PLATELET # BLD AUTO: 217 K/UL (ref 164–446)
PMV BLD AUTO: 10.1 FL (ref 9–12.9)
POTASSIUM SERPL-SCNC: 4.2 MMOL/L (ref 3.6–5.5)
PROT SERPL-MCNC: 6.9 G/DL (ref 6–8.2)
PROT UR QL STRIP: NEGATIVE MG/DL
RBC # BLD AUTO: 4.72 M/UL (ref 4.2–5.4)
RBC # URNS HPF: ABNORMAL /HPF
RBC UR QL AUTO: NEGATIVE
SODIUM SERPL-SCNC: 139 MMOL/L (ref 135–145)
SP GR UR STRIP.AUTO: 1.02
TRIGL SERPL-MCNC: 133 MG/DL (ref 0–149)
TSH SERPL DL<=0.005 MIU/L-ACNC: 3.14 UIU/ML (ref 0.38–5.33)
UROBILINOGEN UR STRIP.AUTO-MCNC: 0.2 MG/DL
WBC # BLD AUTO: 4.4 K/UL (ref 4.8–10.8)
WBC #/AREA URNS HPF: ABNORMAL /HPF

## 2020-09-10 PROCEDURE — 82306 VITAMIN D 25 HYDROXY: CPT

## 2020-09-10 PROCEDURE — 81001 URINALYSIS AUTO W/SCOPE: CPT

## 2020-09-10 PROCEDURE — 84443 ASSAY THYROID STIM HORMONE: CPT

## 2020-09-10 PROCEDURE — 85025 COMPLETE CBC W/AUTO DIFF WBC: CPT

## 2020-09-10 PROCEDURE — 80061 LIPID PANEL: CPT

## 2020-09-10 PROCEDURE — 36415 COLL VENOUS BLD VENIPUNCTURE: CPT

## 2020-09-10 PROCEDURE — 80053 COMPREHEN METABOLIC PANEL: CPT
